# Patient Record
Sex: FEMALE | Race: WHITE | Employment: FULL TIME | URBAN - NONMETROPOLITAN AREA
[De-identification: names, ages, dates, MRNs, and addresses within clinical notes are randomized per-mention and may not be internally consistent; named-entity substitution may affect disease eponyms.]

---

## 2022-05-13 ENCOUNTER — APPOINTMENT (OUTPATIENT)
Dept: CT IMAGING | Age: 52
End: 2022-05-13
Payer: COMMERCIAL

## 2022-05-13 ENCOUNTER — HOSPITAL ENCOUNTER (EMERGENCY)
Age: 52
Discharge: HOME | End: 2022-05-13
Attending: STUDENT IN AN ORGANIZED HEALTH CARE EDUCATION/TRAINING PROGRAM
Payer: COMMERCIAL

## 2022-05-13 VITALS
TEMPERATURE: 98.2 F | BODY MASS INDEX: 23.99 KG/M2 | RESPIRATION RATE: 19 BRPM | HEART RATE: 99 BPM | SYSTOLIC BLOOD PRESSURE: 102 MMHG | DIASTOLIC BLOOD PRESSURE: 75 MMHG | OXYGEN SATURATION: 100 % | HEIGHT: 65 IN | WEIGHT: 144 LBS

## 2022-05-13 DIAGNOSIS — R25.1 TREMOR: Primary | ICD-10-CM

## 2022-05-13 LAB
ANION GAP SERPL CALCULATED.3IONS-SCNC: 14 MMOL/L (ref 4–16)
BASOPHILS ABSOLUTE: 0.1 K/CU MM
BASOPHILS RELATIVE PERCENT: 0.8 % (ref 0–1)
BUN BLDV-MCNC: 28 MG/DL (ref 6–23)
CALCIUM SERPL-MCNC: 9.4 MG/DL (ref 8.3–10.6)
CHLORIDE BLD-SCNC: 104 MMOL/L (ref 99–110)
CO2: 17 MMOL/L (ref 21–32)
CREAT SERPL-MCNC: 2.1 MG/DL (ref 0.6–1.1)
DIFFERENTIAL TYPE: ABNORMAL
EOSINOPHILS ABSOLUTE: 0.2 K/CU MM
EOSINOPHILS RELATIVE PERCENT: 3 % (ref 0–3)
GFR AFRICAN AMERICAN: 30 ML/MIN/1.73M2
GFR NON-AFRICAN AMERICAN: 25 ML/MIN/1.73M2
GLUCOSE BLD-MCNC: 112 MG/DL (ref 70–99)
HCT VFR BLD CALC: 39.4 % (ref 37–47)
HEMOGLOBIN: 12.7 GM/DL (ref 12.5–16)
IMMATURE NEUTROPHIL %: 0.5 % (ref 0–0.43)
LYMPHOCYTES ABSOLUTE: 1.1 K/CU MM
LYMPHOCYTES RELATIVE PERCENT: 15 % (ref 24–44)
MCH RBC QN AUTO: 30.5 PG (ref 27–31)
MCHC RBC AUTO-ENTMCNC: 32.2 % (ref 32–36)
MCV RBC AUTO: 94.7 FL (ref 78–100)
MONOCYTES ABSOLUTE: 0.7 K/CU MM
MONOCYTES RELATIVE PERCENT: 10 % (ref 0–4)
PDW BLD-RTO: 14.4 % (ref 11.7–14.9)
PLATELET # BLD: 288 K/CU MM (ref 140–440)
PMV BLD AUTO: 9.1 FL (ref 7.5–11.1)
POTASSIUM SERPL-SCNC: 3.7 MMOL/L (ref 3.5–5.1)
RBC # BLD: 4.16 M/CU MM (ref 4.2–5.4)
SEGMENTED NEUTROPHILS ABSOLUTE COUNT: 5.2 K/CU MM
SEGMENTED NEUTROPHILS RELATIVE PERCENT: 70.7 % (ref 36–66)
SODIUM BLD-SCNC: 135 MMOL/L (ref 135–145)
TOTAL IMMATURE NEUTOROPHIL: 0.04 K/CU MM
WBC # BLD: 7.4 K/CU MM (ref 4–10.5)

## 2022-05-13 PROCEDURE — 93005 ELECTROCARDIOGRAM TRACING: CPT | Performed by: EMERGENCY MEDICINE

## 2022-05-13 PROCEDURE — 85025 COMPLETE CBC W/AUTO DIFF WBC: CPT

## 2022-05-13 PROCEDURE — 80048 BASIC METABOLIC PNL TOTAL CA: CPT

## 2022-05-13 PROCEDURE — 70450 CT HEAD/BRAIN W/O DYE: CPT

## 2022-05-13 PROCEDURE — 99284 EMERGENCY DEPT VISIT MOD MDM: CPT

## 2022-05-13 RX ORDER — LANOLIN ALCOHOL/MO/W.PET/CERES
3 CREAM (GRAM) TOPICAL DAILY
COMMUNITY

## 2022-05-13 RX ORDER — METOCLOPRAMIDE 10 MG/1
10 TABLET ORAL 3 TIMES DAILY
COMMUNITY

## 2022-05-13 RX ORDER — SODIUM BICARBONATE 650 MG/1
1950 TABLET ORAL 3 TIMES DAILY
COMMUNITY

## 2022-05-13 RX ORDER — TRAMADOL HYDROCHLORIDE 50 MG/1
TABLET ORAL
COMMUNITY
Start: 2022-05-03 | End: 2022-05-13 | Stop reason: ALTCHOICE

## 2022-05-13 RX ORDER — LEFLUNOMIDE 10 MG/1
TABLET ORAL
COMMUNITY
Start: 2019-05-17

## 2022-05-13 RX ORDER — PILOCARPINE HYDROCHLORIDE 5 MG/1
5 TABLET, FILM COATED ORAL 3 TIMES DAILY
COMMUNITY

## 2022-05-13 RX ORDER — ONDANSETRON 4 MG/1
TABLET, FILM COATED ORAL
COMMUNITY
Start: 2022-04-25

## 2022-05-13 RX ORDER — NIFEDIPINE 30 MG/1
30 TABLET, FILM COATED, EXTENDED RELEASE ORAL NIGHTLY
COMMUNITY
Start: 2018-06-01

## 2022-05-13 RX ORDER — SENNA PLUS 8.6 MG/1
1 TABLET ORAL DAILY
COMMUNITY

## 2022-05-13 RX ORDER — POTASSIUM CHLORIDE 1.5 G/1.77G
20 POWDER, FOR SOLUTION ORAL DAILY
COMMUNITY

## 2022-05-13 RX ORDER — PREGABALIN 75 MG/1
CAPSULE ORAL
COMMUNITY
Start: 2022-02-17

## 2022-05-13 RX ORDER — LORAZEPAM 1 MG/1
TABLET ORAL
COMMUNITY
Start: 2022-04-19

## 2022-05-13 ASSESSMENT — PAIN - FUNCTIONAL ASSESSMENT: PAIN_FUNCTIONAL_ASSESSMENT: NONE - DENIES PAIN

## 2022-05-13 NOTE — ED PROVIDER NOTES
[id number]      Pt Name: Brit Bustos  MRN: [id number]  [place] 1970  Date of evaluation: 5/13/2022  Provider: Megan Geller MD    CHIEF COMPLAINT       Chief Complaint   Patient presents with    Altered Mental Status     pt became shaky while at work pt reports left side felt pressure as well        HISTORY OF PRESENT ILLNESS    Brit Bustos is a 45 y.o. female With a history of lupus, rheumatoid arthritis, partial bowel resection, chronic kidney disease, presenting for acute onset tremors. Patient was at work when she began to feel whole body tremors. She has not had tremors in the past.  States tremors are intermittent in intensity and are experienced throughout her entire body. Also describing left-sided head fullness without headache. Reports that it is difficult to describe and that she feels \"weird \". Denies any changes in vision, speech, numbness, weakness. Denies any current or recent fevers, chills, headaches, chest pain, shortness of breath, abdominal pain, dysuria, lower extremity pain or swelling. Does endorse chronic constipation due to the medication she takes to help with her chronic bowel issues. Nursing Notes were reviewed. REVIEW OF SYSTEMS     Review of Systems  A 10 point review of system was performed and is otherwise negative apart from what is noted in HPI and nursing notes. As is my standard practice, all pertinent positives from the ROS are documented in the HPI.     PAST MEDICAL HISTORY     Past Medical History:   Diagnosis Date    CKD (chronic kidney disease)     Fibromyalgia     Gastrointestinal complaint     Lupus ([facility] [address]        SURGICAL HISTORY       Past Surgical History:   Procedure Laterality Date    COLON SURGERY      SMALL INTESTINE SURGERY         CURRENT MEDICATIONS       Previous Medications    LEFLUNOMIDE (ARAVA) 10 MG TABLET        LINACLOTIDE (LINZESS) 290 MCG CAPS CAPSULE Take 290 mcg by mouth daily    LORAZEPAM (ATIVAN) 1 MG TABLET    TAKE 1 TABLET BY MOUTH AT BEDTIME AS NEEDED FOR SITUATION ANXIETY    MELATONIN 3 MG TABS TABLET    Take 3 mg by mouth daily    METOCLOPRAMIDE (REGLAN) 10 MG TABLET    Take 10 mg by mouth in the morning, at noon, and at bedtime    NIFEDIPINE (ADALAT CC) 30 MG EXTENDED RELEASE TABLET    Take 30 mg by mouth nightly    ONDANSETRON (ZOFRAN) 4 MG TABLET    TAKE 1 TABLET BY MOUTH EVERY 6 HOURS AS NEEDED FOR NAUSEA AND VOMITING    PILOCARPINE (SALAGEN) 5 MG TABLET    Take 5 mg by mouth 3 times daily    POTASSIUM CHLORIDE (KLOR-CON) 20 MEQ PACKET    Take 20 mEq by mouth daily    PREGABALIN (LYRICA) 75 MG CAPSULE    TAKE 1 CAPSULE BY MOUTH EVERY DAY    SENNA (SENOKOT) 8.6 MG TABLET    Take 1 tablet by mouth daily    SODIUM BICARBONATE 650 MG TABLET    Take 1,950 mg by mouth 3 times daily       ALLERGIES     Patient has no allergy information on record. FAMILY HISTORY     History reviewed. No pertinent family history. SOCIAL HISTORY       Social History     Socioeconomic History    Marital status: Single     Spouse name: None    Number of children: None    Years of education: None    Highest education level: None   Occupational History    None   Tobacco Use    Smoking status: Never Smoker    Smokeless tobacco: Never Used   Substance and Sexual Activity    Alcohol use: Not Currently    Drug use: Never    Sexual activity: None   Other Topics Concern    None   Social History Narrative    None     Social Determinants of Health     Financial Resource Strain:     Difficulty of Paying Living Expenses: Not on file   Food Insecurity:     Worried About Running Out of Food in the Last Year: Not on file    Porfirio of Food in the Last Year: Not on file   Transportation Needs:     Lack of Transportation (Medical): Not on file    Lack of Transportation (Non-Medical):  Not on file   Physical Activity:     Days of Exercise per Week: Not on file    Minutes of Exercise per Session: Not on file   Stress:     Feeling of Stress : Not on file   Social Connections:     Frequency of Communication with Friends and Family: Not on file    Frequency of Social Gatherings with Friends and Family: Not on file    Attends [employer] Services: Not on file    Active Member of [address] [place] [address] or Organizations: Not on file    Attends Club or Organization Meetings: Not on file    Marital Status: Not on file   Intimate Partner Violence:     Fear of Current or Ex-Partner: Not on file    Emotionally Abused: Not on file    Physically Abused: Not on file    Sexually Abused: Not on file   Housing Stability:     Unable to Pay for Housing in the Last Year: Not on file    Number of [place] in the Last Year: Not on file    Unstable Housing in the Last Year: Not on file       PHYSICAL EXAM       ED Triage Vitals [05/13/22 1114]   BP Temp Temp Source Pulse Resp SpO2 Height Weight   121/75 98.2 °F (36.8 °C) Oral 101 16 100 % 5' 5\" (1.651 m) 144 lb (65.3 kg)         Physical Exam  Vitals and nursing note reviewed. Constitutional:       General: She is not in acute distress. Appearance: She is not toxic-appearing. HENT:      Head: Normocephalic and atraumatic. Eyes:      General: No visual field deficit or scleral icterus. Extraocular Movements: Extraocular movements intact. Conjunctiva/sclera: Conjunctivae normal.      Pupils: Pupils are equal, round, and reactive to light. Cardiovascular:      Rate and Rhythm: Normal rate and regular rhythm. Comments: Initially borderline tachycardic but heart rates in the mid 90s on my evaluation  Pulmonary:      Effort: Pulmonary effort is normal. No respiratory distress. Abdominal:      General: Abdomen is flat. There is no distension. Palpations: Abdomen is soft. Tenderness: There is no abdominal tenderness. Musculoskeletal:         General: Normal range of motion.       Cervical back: Normal range of motion and neck supple. Skin:     General: Skin is warm and dry. Neurological:      General: No focal deficit present. Mental Status: She is alert and oriented to person, place, and time. Cranial Nerves: No cranial nerve deficit, dysarthria or facial asymmetry. Sensory: No sensory deficit. Motor: No weakness. Coordination: Coordination normal.      Gait: Gait normal.   Psychiatric:         Mood and Affect: Mood normal.         Behavior: Behavior normal.         DIAGNOSTIC RESULTS     EKG: All EKG's are interpreted by me in the absence of a cardiologist.  Sinus tachycardia with occasional premature ventricular complexes. Rate of 111. Normal CO, QRS, QTc intervals. No ST elevation, depression. No previous for comparison. RADIOLOGY:   Interpretation per the Radiologist below, if available at the time of this note:    CT HEAD WO CONTRAST   Final Result   No acute intracranial abnormality. LABS:  Labs Reviewed   CBC WITH AUTO DIFFERENTIAL - Abnormal; Notable for the following components:       Result Value    RBC 4.16 (*)     Segs Relative 70.7 (*)     Lymphocytes % 15.0 (*)     Monocytes % 10.0 (*)     Immature Neutrophil % 0.5 (*)     All other components within normal limits   BASIC METABOLIC PANEL W/ REFLEX TO MG FOR LOW K - Abnormal; Notable for the following components:    CO2 17 (*)     BUN 28 (*)     CREATININE 2.1 (*)     Glucose 112 (*)     GFR Non- 25 (*)     GFR  30 (*)     All other components within normal limits       All other labs were within normal range or not returned as of this dictation. Jose Alejandro LOPEZ [address] COURSE     ED Course as of 05/13/22 1325   Fri May 13, 2022   1156 NIH 0 on my exam. Nurse initially notes mild dysarthria, no dysarthria on my exam.  I spoke with nurse and she scored this due to patient's stuttering from her tremors, not due to actual slurred speech.   Patient has not had any slurred speech on arrival or while in the emergency department. [RB]      ED Course User Index  [RB] Francis Fallon MD       DIFFERENTIAL DIAGNOSIS/MDM:   Vitals:    Vitals:    05/13/22 1159 05/13/22 1202 05/13/22 1215 05/13/22 1230   BP: 95/65 97/68 98/69 102/75   Pulse: 98 101 96 99   Resp: 12 16 11 19   Temp:       TempSrc:       SpO2: 100% 99% 100% 100%   Weight:       Height:           MDM  Number of Diagnoses or Management Options  Tremor  Diagnosis management comments: 62-year-old female with history of autoimmune diseases presenting for acute onset tremors. When patient arrived she had intermittent tremors to her entire body. Was otherwise without any focal neurologic deficits. Was mentating appropriately with appropriate speech. NIH of 0. Is also endorsing a left-sided head fullness which was without pain. No headache. During my exam she then began describing that the fullness moved into her right shoulder neck area. Remainder physical exam is unremarkable. Patient has no other acute complaints recently. Labs show known chronic kidney disease but otherwise unremarkable. Head CT was without any acute intracranial abnormality such as bleed, stroke, or mass. On my reexamination, patient's tremors and reports of head fullness had spontaneously resolved. She was still nonfocal on her neuro exam.  Vitals have remained normal throughout. At this time, no clear etiology for patient's tremors. Possibly secondary to medications, benign essential tremor, or psychogenic. Discussed return precautions for any new neurologic symptoms and instructed her to follow-up with her primary care soon as possible as well as possibly seeking out neurologist if these tremors continue. CONSULTS:  None    PROCEDURES:  Unless otherwise noted below, none. Procedures      FINAL IMPRESSION      1. Tremor          PATIENT REFERRED TO:  No follow-up provider specified.     DISCHARGE MEDICATIONS:  New Prescriptions    No medications on file Controlled Substances Monitoring:     No flowsheet data found.     (Please note that portions of this note were completed with a voice recognition program.  Efforts were made to edit the dictations but occasionally words are mis-transcribed.)    Jr Ribeiro MD (electronically signed)  Attending Emergency Physician            Jr Ribeiro MD  05/13/22 [id number]

## 2022-05-15 LAB
EKG ATRIAL RATE: 111 BPM
EKG DIAGNOSIS: NORMAL
EKG P AXIS: 80 DEGREES
EKG P-R INTERVAL: 136 MS
EKG Q-T INTERVAL: 314 MS
EKG QRS DURATION: 84 MS
EKG QTC CALCULATION (BAZETT): 427 MS
EKG R AXIS: 108 DEGREES
EKG T AXIS: 75 DEGREES
EKG VENTRICULAR RATE: 111 BPM

## 2022-05-15 PROCEDURE — 93010 ELECTROCARDIOGRAM REPORT: CPT | Performed by: INTERNAL MEDICINE
